# Patient Record
Sex: FEMALE | Race: OTHER | HISPANIC OR LATINO
[De-identification: names, ages, dates, MRNs, and addresses within clinical notes are randomized per-mention and may not be internally consistent; named-entity substitution may affect disease eponyms.]

---

## 2019-08-16 PROBLEM — Z00.00 ENCOUNTER FOR PREVENTIVE HEALTH EXAMINATION: Status: ACTIVE | Noted: 2019-08-16

## 2019-08-30 ENCOUNTER — APPOINTMENT (OUTPATIENT)
Dept: ORTHOPEDIC SURGERY | Facility: CLINIC | Age: 49
End: 2019-08-30

## 2019-09-12 ENCOUNTER — FORM ENCOUNTER (OUTPATIENT)
Age: 49
End: 2019-09-12

## 2019-09-13 ENCOUNTER — TRANSCRIPTION ENCOUNTER (OUTPATIENT)
Age: 49
End: 2019-09-13

## 2019-09-13 ENCOUNTER — OUTPATIENT (OUTPATIENT)
Dept: OUTPATIENT SERVICES | Facility: HOSPITAL | Age: 49
LOS: 1 days | End: 2019-09-13
Payer: MEDICAID

## 2019-09-13 ENCOUNTER — APPOINTMENT (OUTPATIENT)
Dept: RADIOLOGY | Facility: CLINIC | Age: 49
End: 2019-09-13

## 2019-09-13 ENCOUNTER — APPOINTMENT (OUTPATIENT)
Dept: ORTHOPEDIC SURGERY | Facility: CLINIC | Age: 49
End: 2019-09-13
Payer: COMMERCIAL

## 2019-09-13 VITALS — BODY MASS INDEX: 35.79 KG/M2 | WEIGHT: 250 LBS | RESPIRATION RATE: 16 BRPM | HEIGHT: 70 IN

## 2019-09-13 DIAGNOSIS — Z82.61 FAMILY HISTORY OF ARTHRITIS: ICD-10-CM

## 2019-09-13 DIAGNOSIS — M21.061 VALGUS DEFORMITY, NOT ELSEWHERE CLASSIFIED, RIGHT KNEE: ICD-10-CM

## 2019-09-13 DIAGNOSIS — Z80.3 FAMILY HISTORY OF MALIGNANT NEOPLASM OF BREAST: ICD-10-CM

## 2019-09-13 DIAGNOSIS — Z85.3 PERSONAL HISTORY OF MALIGNANT NEOPLASM OF BREAST: ICD-10-CM

## 2019-09-13 DIAGNOSIS — Z78.9 OTHER SPECIFIED HEALTH STATUS: ICD-10-CM

## 2019-09-13 DIAGNOSIS — Z86.39 PERSONAL HISTORY OF OTHER ENDOCRINE, NUTRITIONAL AND METABOLIC DISEASE: ICD-10-CM

## 2019-09-13 DIAGNOSIS — M21.062 VALGUS DEFORMITY, NOT ELSEWHERE CLASSIFIED, RIGHT KNEE: ICD-10-CM

## 2019-09-13 DIAGNOSIS — M17.0 BILATERAL PRIMARY OSTEOARTHRITIS OF KNEE: ICD-10-CM

## 2019-09-13 PROCEDURE — 73564 X-RAY EXAM KNEE 4 OR MORE: CPT

## 2019-09-13 PROCEDURE — 99204 OFFICE O/P NEW MOD 45 MIN: CPT | Mod: 25

## 2019-09-13 PROCEDURE — 20611 DRAIN/INJ JOINT/BURSA W/US: CPT | Mod: LT

## 2019-09-13 PROCEDURE — 73564 X-RAY EXAM KNEE 4 OR MORE: CPT | Mod: 26,LT

## 2019-09-13 RX ORDER — TAMOXIFEN CITRATE 20 MG/1
20 TABLET, FILM COATED ORAL
Refills: 0 | Status: ACTIVE | COMMUNITY

## 2019-09-13 RX ORDER — LEVOTHYROXINE SODIUM 100 MCG
100 TABLET ORAL
Refills: 0 | Status: ACTIVE | COMMUNITY

## 2020-02-04 ENCOUNTER — FORM ENCOUNTER (OUTPATIENT)
Age: 50
End: 2020-02-04

## 2020-02-05 ENCOUNTER — APPOINTMENT (OUTPATIENT)
Dept: ORTHOPEDIC SURGERY | Facility: CLINIC | Age: 50
End: 2020-02-05
Payer: MEDICAID

## 2020-02-05 ENCOUNTER — APPOINTMENT (OUTPATIENT)
Dept: RADIOLOGY | Facility: CLINIC | Age: 50
End: 2020-02-05

## 2020-02-05 ENCOUNTER — OUTPATIENT (OUTPATIENT)
Dept: OUTPATIENT SERVICES | Facility: HOSPITAL | Age: 50
LOS: 1 days | End: 2020-02-05
Payer: COMMERCIAL

## 2020-02-05 VITALS — RESPIRATION RATE: 16 BRPM | HEIGHT: 70 IN | WEIGHT: 252 LBS | BODY MASS INDEX: 36.08 KG/M2

## 2020-02-05 DIAGNOSIS — M54.5 LOW BACK PAIN: ICD-10-CM

## 2020-02-05 DIAGNOSIS — M72.2 PLANTAR FASCIAL FIBROMATOSIS: ICD-10-CM

## 2020-02-05 PROCEDURE — 73610 X-RAY EXAM OF ANKLE: CPT | Mod: 26,RT

## 2020-02-05 PROCEDURE — 20550 NJX 1 TENDON SHEATH/LIGAMENT: CPT | Mod: RT

## 2020-02-05 PROCEDURE — 73630 X-RAY EXAM OF FOOT: CPT | Mod: 26,RT

## 2020-02-05 PROCEDURE — 99213 OFFICE O/P EST LOW 20 MIN: CPT | Mod: 25

## 2020-02-05 RX ORDER — MELOXICAM 15 MG/1
15 TABLET ORAL DAILY
Qty: 30 | Refills: 1 | Status: ACTIVE | COMMUNITY
Start: 2020-02-05 | End: 1900-01-01

## 2020-02-05 RX ORDER — MELOXICAM 15 MG/1
15 TABLET ORAL DAILY
Qty: 30 | Refills: 2 | Status: DISCONTINUED | COMMUNITY
Start: 2020-02-05 | End: 2020-02-05

## 2020-02-14 ENCOUNTER — APPOINTMENT (OUTPATIENT)
Dept: ORTHOPEDIC SURGERY | Facility: CLINIC | Age: 50
End: 2020-02-14

## 2020-09-09 ENCOUNTER — APPOINTMENT (OUTPATIENT)
Dept: ORTHOPEDIC SURGERY | Facility: CLINIC | Age: 50
End: 2020-09-09

## 2020-09-21 ENCOUNTER — APPOINTMENT (OUTPATIENT)
Dept: ORTHOPEDIC SURGERY | Facility: CLINIC | Age: 50
End: 2020-09-21

## 2024-01-21 ENCOUNTER — EMERGENCY (EMERGENCY)
Facility: HOSPITAL | Age: 54
LOS: 1 days | Discharge: HOME | End: 2024-01-21
Attending: EMERGENCY MEDICINE | Admitting: EMERGENCY MEDICINE
Payer: MEDICAID

## 2024-01-21 VITALS
OXYGEN SATURATION: 98 % | RESPIRATION RATE: 16 BRPM | TEMPERATURE: 98 F | WEIGHT: 214.95 LBS | HEIGHT: 70 IN | DIASTOLIC BLOOD PRESSURE: 77 MMHG | HEART RATE: 62 BPM | SYSTOLIC BLOOD PRESSURE: 125 MMHG

## 2024-01-21 VITALS
OXYGEN SATURATION: 99 % | DIASTOLIC BLOOD PRESSURE: 73 MMHG | SYSTOLIC BLOOD PRESSURE: 109 MMHG | RESPIRATION RATE: 15 BRPM | HEART RATE: 62 BPM

## 2024-01-21 DIAGNOSIS — M51.26 OTHER INTERVERTEBRAL DISC DISPLACEMENT, LUMBAR REGION: ICD-10-CM

## 2024-01-21 DIAGNOSIS — M54.50 LOW BACK PAIN, UNSPECIFIED: ICD-10-CM

## 2024-01-21 DIAGNOSIS — Z85.3 PERSONAL HISTORY OF MALIGNANT NEOPLASM OF BREAST: ICD-10-CM

## 2024-01-21 PROCEDURE — 99284 EMERGENCY DEPT VISIT MOD MDM: CPT

## 2024-01-21 PROCEDURE — 72131 CT LUMBAR SPINE W/O DYE: CPT | Mod: 26

## 2024-01-21 RX ORDER — FAMOTIDINE 10 MG/ML
1 INJECTION INTRAVENOUS
Qty: 14 | Refills: 0
Start: 2024-01-21 | End: 2024-02-03

## 2024-01-21 RX ORDER — KETOROLAC TROMETHAMINE 30 MG/ML
15 SYRINGE (ML) INJECTION ONCE
Refills: 0 | Status: DISCONTINUED | OUTPATIENT
Start: 2024-01-21 | End: 2024-01-21

## 2024-01-21 RX ORDER — MELOXICAM 15 MG/1
1 TABLET ORAL
Qty: 14 | Refills: 0
Start: 2024-01-21 | End: 2024-02-03

## 2024-01-21 RX ORDER — CYCLOBENZAPRINE HYDROCHLORIDE 10 MG/1
10 TABLET, FILM COATED ORAL ONCE
Refills: 0 | Status: COMPLETED | OUTPATIENT
Start: 2024-01-21 | End: 2024-01-21

## 2024-01-21 RX ORDER — CYCLOBENZAPRINE HYDROCHLORIDE 10 MG/1
1 TABLET, FILM COATED ORAL
Qty: 21 | Refills: 0
Start: 2024-01-21 | End: 2024-01-27

## 2024-01-21 RX ORDER — MELOXICAM 15 MG/1
1 TABLET ORAL
Qty: 7 | Refills: 0
Start: 2024-01-21 | End: 2024-01-27

## 2024-01-21 RX ORDER — LIDOCAINE 4 G/100G
1 CREAM TOPICAL ONCE
Refills: 0 | Status: COMPLETED | OUTPATIENT
Start: 2024-01-21 | End: 2024-01-21

## 2024-01-21 RX ORDER — FAMOTIDINE 10 MG/ML
20 INJECTION INTRAVENOUS ONCE
Refills: 0 | Status: COMPLETED | OUTPATIENT
Start: 2024-01-21 | End: 2024-01-21

## 2024-01-21 RX ADMIN — FAMOTIDINE 20 MILLIGRAM(S): 10 INJECTION INTRAVENOUS at 15:03

## 2024-01-21 RX ADMIN — Medication 15 MILLIGRAM(S): at 15:03

## 2024-01-21 RX ADMIN — LIDOCAINE 1 PATCH: 4 CREAM TOPICAL at 15:04

## 2024-01-21 RX ADMIN — Medication 15 MILLIGRAM(S): at 16:17

## 2024-01-21 RX ADMIN — CYCLOBENZAPRINE HYDROCHLORIDE 10 MILLIGRAM(S): 10 TABLET, FILM COATED ORAL at 15:03

## 2024-01-21 RX ADMIN — Medication 50 MILLIGRAM(S): at 15:04

## 2024-01-21 NOTE — ED ADULT NURSE NOTE - OBJECTIVE STATEMENT
Pt presents to ed for back pain while giving her mother a sponge bath. Pt reports numbness in left leg and difficulty ambulating. Pt observed to have ambulated into ed.  Hx Breast CA 2017, hypothyroid, asthma. NKDA. Pt reports back pain as a 10/10 back pain. \  Plan of care ongoing

## 2024-01-21 NOTE — ED PROVIDER NOTE - CARE PROVIDER_API CALL
Brian Gardner [facility]  Orthopaedic Surgery  [address]  [place] [address]  Phone: [phone]  Fax: [phone]  Follow Up Time:

## 2024-01-21 NOTE — ED PROVIDER NOTE - PATIENT PORTAL LINK FT
You can access the FollowMyHealth Patient Portal offered by [facility] by registering at the following website: [email or web address]. By joining [employer]’s FollowMyHealth portal, you will also be able to view your health information using other applications (apps) compatible with our system.

## 2024-01-21 NOTE — ED ADULT NURSE REASSESSMENT NOTE - NS ED NURSE REASSESS COMMENT FT1
Pt medicated as ordered. All medication education given to patient and pt understands. Plan of care ongoing

## 2024-01-21 NOTE — ED ADULT TRIAGE NOTE - CHIEF COMPLAINT QUOTE
Pt reports she was lifting her mom last night to help her bathe and felt something "pop." Pt now with lower back pain, difficulty ambulating and left leg numbness. Pt took Aleve with no relief of pain.

## 2024-01-21 NOTE — ED PROVIDER NOTE - OBJECTIVE STATEMENT
54 yo F PMH breast cancer on anastrazole, carpal tunnel, reporting left lower back pain.  Symptoms started yesterday while lifting her mother who is bedbound.  Pain not relieved by aleve.  Has had back pain in the past, but much more mild and without other symptoms.  She is having tingling down the left lower extremity but not weakness.  No saddle anesthesia, no incontinence/retention.

## 2024-01-21 NOTE — ED PROVIDER NOTE - NSFOLLOWUPINSTRUCTIONS_ED_ALL_ED_FT
SEE YOUR DOCTOR THIS WEEK FOR A FOLLOW UP VISIT.  YOU SHOULD ASK FOR A PHYSICAL THERAPY PRESCRIPTION BECAUSE THIS WILL HELP YOUR SYMPTOMS AND PREVENT MORE EPISODES.    YOU MAY ALSO NEED TO SEE A SPINE SPECIALIST IF SYMPTOMS DON'T RESOLVE.  A NAME APPEARS BELOW.       RETURN IMMEDIATELY FOR:  SEVERE PAIN  TROUBLE WALKING  NUMBNESS TO THE LEG  WEAKNESS IN THE LEGS  NUMBNESS TO THE AREA OF THE ANUS OR VAGINA  INABILITY TO CONTROL YOUR BOWELS OR BLADDER  ANY OTHER NEW, WORSENING OR CONCERNING SYMPTOMS SEE YOUR DOCTOR THIS WEEK FOR A FOLLOW UP VISIT.  YOU SHOULD ASK FOR A PHYSICAL THERAPY PRESCRIPTION BECAUSE THIS WILL HELP YOUR SYMPTOMS AND PREVENT MORE EPISODES.    YOU MAY ALSO NEED TO SEE A SPINE SPECIALIST IF SYMPTOMS DON'T RESOLVE.  A NAME APPEARS BELOW.     THE PATCH ON YOUR BACK IS CALLED A LIDOCAINE PATCH.  THESE ARE SOLD OVER THE COUNTER.    DO NOT TAKE MOTRIN, IBUPROFEN, ADVIL, ALEVE, NAPROSYN WHILE ON THE PRESCRIPTIONS     RETURN IMMEDIATELY FOR:  SEVERE PAIN  TROUBLE WALKING  NUMBNESS TO THE LEG  WEAKNESS IN THE LEGS  NUMBNESS TO THE AREA OF THE ANUS OR VAGINA  INABILITY TO CONTROL YOUR BOWELS OR BLADDER  ANY OTHER NEW, WORSENING OR CONCERNING SYMPTOMS        Herniated Disk    A herniated disk happens when a disk in the spine bulges out too far. There is an oval disk between each pair of bones (vertebrae) in the backbone or spine. The disks connect the bones, help the spine move, and keep the bones from rubbing against each other when you move.    A herniated disk can happen anywhere in the back or neck area. It most often affects the lower back.    What are the causes?  This condition may be caused by:  Wear and tear as you age.  Sudden injury, such as a strain or sprain.  What increases the risk?  The following factors may make you more likely to develop this condition:  Age. The older you are the higher the risk.  Being a man who is 30–50 years old.  Doing activities that involve heavy lifting, bending, or twisting.  Not getting enough exercise.  Being overweight.  Smoking or using tobacco.  What are the signs or symptoms?  Symptoms may vary depending on where the herniated disk is in your body.  Sharp pain in the arm, hip, butt, or in the lower back. The pain can spread to the leg and foot.    This condition may be treated with:  Resting for a few days or several weeks.  Do not do things that need a lot of effort. Do not go into complete bed rest. Do only light activities.  If you have a herniated disk in your lower back, limit how much you sit. Sitting puts more pressure on the disk.  Medicines for pain, swelling, or tense muscles.  Ice or heat therapy.  Steroid shots. These can reduce pain and swelling.  Physical therapy to strengthen your back.  Follow these instructions at home:  Eat foods that are high in fiber. These include beans, whole grains, and fresh fruits and vegetables.  Limit foods that are high in fat and processed sugars. These include fried or sweet foods.  Ask your doctor if you should avoid driving or using machines while you are taking your medicines.  Managing pain, stiffness, and swelling        If told, put ice on the affected area. To do this:  Put ice in a plastic bag.  Place a towel between your skin and the bag.  Leave the ice on for 20 minutes, 2–3 times a day.  If told, put heat on the painful area. Use the heat source that your doctor recommends, such as a moist heat pack or a heating pad.  Place a towel between your skin and the heat source.  Leave the heat on for 20–30 minutes.  Take off the heat or ice if your skin turns bright red. If you cannot feel pain, heat, or cold, you have a greater risk of getting burned.    Activity    Rest as told by your doctor. Avoid strict bed rest. Do only activities that do not cause pain.  After your rest period:  Return to your normal activities as told by your doctor. Slowly start doing exercises as told. Ask your doctor what activities and exercises are safe for you.  Use good posture.  Avoid movements that cause pain.  Do not lift anything that is heavier than 10 lb (4.5 kg), or the limit that you are told.  Do not sit or stand for a long time without moving.  Do not sit for a long time without getting up and moving around.  If exercises (physical therapy) were prescribed, do them as told by your doctor.  Try to strengthen your back and belly with exercises such as swimming or walking.    General instructions    Do not smoke or use any products that contain nicotine or tobacco. If you need help quitting, ask your doctor.  Do not wear high-heeled shoes.  Do not sleep on your belly.  If you are overweight, work with your doctor to lose weight safely.  Keep all follow-up visits.  How is this prevented?  Stay at a healthy weight.  Stay in shape. Do at least 150 minutes of moderate-intensity exercise each week, such as fast walking or water aerobics.    When lifting objects:  Keep your feet as far apart as your shoulders or farther apart.  Tighten your belly muscles.  Bend your knees and hips, and keep your spine neutral. Lift using the strength of your legs, not your back. Do not lock your knees straight out.  Always ask for help to lift heavy or large objects.

## 2024-01-21 NOTE — ED PROVIDER NOTE - PRO INTERPRETER NEED 2
English Assistance with ambulation/Assistance OOB with selected safe patient handling equipment/Communicate Risk of Fall with Harm to all staff/Discuss with provider need for PT consult/Monitor gait and stability/Provide patient with walking aids - walker, cane, crutches/Reinforce activity limits and safety measures with patient and family/Tailored Fall Risk Interventions/Visual Cue: Yellow wristband and red socks/Bed in lowest position, wheels locked, appropriate side rails in place/Call bell, personal items and telephone in reach/Instruct patient to call for assistance before getting out of bed or chair/Non-slip footwear when patient is out of bed/[place] to call system/Physically safe environment - no spills, clutter or unnecessary equipment/Purposeful Proactive Rounding/Room/bathroom lighting operational, light cord in reach

## 2024-01-21 NOTE — ED PROVIDER NOTE - PHYSICAL EXAMINATION
General:  Well appearing, no distress  HEENT:  No conjunctival injection, neck supple, no congestion   Chest:  Non-tender, no crepitance  Lungs:  Clear to auscultation bilaterally   Heart:  s1s2 normal, no murmur  Abdomen:  soft, non-tender, non-distended  :  Deferred  Rectal:  Deferred  Back:  Moderate tenderness to the mid and left lower lumbar spine/paraspinal muscles into the sacral region.  Extremities: No edema, normal perfusion, no joint swelling or tenderness  Neuro:  Alert, conversant, motor intact.  She has a tingling feeling in the LLE but normal sensation

## 2024-01-21 NOTE — ED PROVIDER NOTE - CLINICAL SUMMARY MEDICAL DECISION MAKING FREE TEXT BOX
52 yo F PMH includes breast cancer 54 yo F PMH includes breast cancer with LBP and tingling in the LLE.  Concern for herniated disc.  In light of cancer hx, will check for fx with ct.     16:30:  Trying to patient is feeling improved.  Feels ready to go home.  Discussed results in detail as well as discharge follow-up plan and strict return precautions.  Questions answered and patient verbalizes understanding of discussion and plan.

## 2024-04-12 ENCOUNTER — EMERGENCY (EMERGENCY)
Facility: HOSPITAL | Age: 54
LOS: 1 days | Discharge: HOME | End: 2024-04-12
Admitting: EMERGENCY MEDICINE
Payer: MEDICAID

## 2024-04-12 VITALS
OXYGEN SATURATION: 98 % | SYSTOLIC BLOOD PRESSURE: 113 MMHG | TEMPERATURE: 98 F | DIASTOLIC BLOOD PRESSURE: 67 MMHG | HEART RATE: 73 BPM | RESPIRATION RATE: 18 BRPM

## 2024-04-12 PROBLEM — C50.919 MALIGNANT NEOPLASM OF UNSPECIFIED SITE OF UNSPECIFIED FEMALE BREAST: Chronic | Status: ACTIVE | Noted: 2024-01-21

## 2024-04-12 PROCEDURE — 99284 EMERGENCY DEPT VISIT MOD MDM: CPT

## 2024-04-12 RX ORDER — IBUPROFEN 200 MG
600 TABLET ORAL ONCE
Refills: 0 | Status: COMPLETED | OUTPATIENT
Start: 2024-04-12 | End: 2024-04-12

## 2024-04-12 RX ORDER — ACETAMINOPHEN 500 MG
650 TABLET ORAL ONCE
Refills: 0 | Status: COMPLETED | OUTPATIENT
Start: 2024-04-12 | End: 2024-04-12

## 2024-04-12 RX ORDER — DIAZEPAM 5 MG
5 TABLET ORAL ONCE
Refills: 0 | Status: DISCONTINUED | OUTPATIENT
Start: 2024-04-12 | End: 2024-04-12

## 2024-04-12 RX ORDER — LIDOCAINE 4 G/100G
1 CREAM TOPICAL ONCE
Refills: 0 | Status: COMPLETED | OUTPATIENT
Start: 2024-04-12 | End: 2024-04-12

## 2024-04-12 RX ORDER — METHOCARBAMOL 500 MG/1
2 TABLET, FILM COATED ORAL
Qty: 16 | Refills: 0
Start: 2024-04-12 | End: 2024-04-15

## 2024-04-12 RX ADMIN — Medication 5 MILLIGRAM(S): at 13:16

## 2024-04-12 RX ADMIN — Medication 600 MILLIGRAM(S): at 13:17

## 2024-04-12 RX ADMIN — Medication 650 MILLIGRAM(S): at 13:16

## 2024-04-12 RX ADMIN — LIDOCAINE 1 PATCH: 4 CREAM TOPICAL at 13:15

## 2024-04-12 NOTE — ED PROVIDER NOTE - OBJECTIVE STATEMENT
54-year-old female coming in with lumbar back pain stating she is chronic back pain with lumbar disc disease and that she bent over the previous week has had lumbar back pain since with radiation down the left side.  Denies urinary retention, saddle anesthesia, weakness.  Denies chest pain, nausea, vomiting, fevers, chills.  Appears well no acute distress

## 2024-04-12 NOTE — ED PROVIDER NOTE - PATIENT PORTAL LINK FT
You can access the FollowMyHealth Patient Portal offered by [facility] by registering at the following website: [email or web address]. By joining [employer]’s FollowMyHealth portal, you will also be able to view your health information using other applications (apps) compatible with our system.

## 2024-04-12 NOTE — ED ADULT TRIAGE NOTE - CHIEF COMPLAINT QUOTE
lower left back pain for 1 week has herniated disc in L region now numbness down left hip and thigh for past few days, no new injury

## 2024-04-15 DIAGNOSIS — M51.86 OTHER INTERVERTEBRAL DISC DISORDERS, LUMBAR REGION: ICD-10-CM

## 2024-04-15 DIAGNOSIS — M54.50 LOW BACK PAIN, UNSPECIFIED: ICD-10-CM

## 2024-08-15 NOTE — ED PROVIDER NOTE - NS ED MD DISPO DISCHARGE
Patient given number to surgery department to get instructions from surgical nurse regarding abdominal binder that was put on pt    Hospice - Health Care Facility

## 2025-08-13 ENCOUNTER — APPOINTMENT (OUTPATIENT)
Dept: ORTHOPEDIC SURGERY | Facility: CLINIC | Age: 55
End: 2025-08-13